# Patient Record
Sex: MALE | URBAN - METROPOLITAN AREA
[De-identification: names, ages, dates, MRNs, and addresses within clinical notes are randomized per-mention and may not be internally consistent; named-entity substitution may affect disease eponyms.]

---

## 2021-10-26 ENCOUNTER — APPOINTMENT (RX ONLY)
Dept: URBAN - METROPOLITAN AREA CLINIC 39 | Facility: CLINIC | Age: 26
Setting detail: DERMATOLOGY
End: 2021-10-26

## 2021-10-26 DIAGNOSIS — L10.0 PEMPHIGUS VULGARIS: ICD-10-CM | Status: INADEQUATELY CONTROLLED

## 2021-10-26 DIAGNOSIS — N48.1 BALANITIS: ICD-10-CM | Status: INADEQUATELY CONTROLLED

## 2021-10-26 DIAGNOSIS — L21.8 OTHER SEBORRHEIC DERMATITIS: ICD-10-CM | Status: INADEQUATELY CONTROLLED

## 2021-10-26 PROCEDURE — ? COUNSELING

## 2021-10-26 PROCEDURE — ? PRESCRIPTION

## 2021-10-26 PROCEDURE — ? KOH PREP

## 2021-10-26 PROCEDURE — 99204 OFFICE O/P NEW MOD 45 MIN: CPT

## 2021-10-26 PROCEDURE — ? PRESCRIPTION MEDICATION MANAGEMENT

## 2021-10-26 RX ORDER — HYDROCORTISONE, IODOQUINOL 10; 10 MG/G; MG/G
CREAM TOPICAL BID
Qty: 28.4 | Refills: 3 | Status: ERX | COMMUNITY
Start: 2021-10-26

## 2021-10-26 RX ADMIN — HYDROCORTISONE, IODOQUINOL: 10; 10 CREAM TOPICAL at 00:00

## 2021-10-26 ASSESSMENT — LOCATION DETAILED DESCRIPTION DERM
LOCATION DETAILED: LEFT LATERAL DISTAL DORSAL PENILE SHAFT
LOCATION DETAILED: RIGHT CENTRAL FRONTAL SCALP
LOCATION DETAILED: LEFT MEDIAL SUPERIOR CHEST
LOCATION DETAILED: RIGHT MEDIAL SUPERIOR CHEST

## 2021-10-26 ASSESSMENT — SEVERITY ASSESSMENT: SEVERITY: MILD TO MODERATE

## 2021-10-26 ASSESSMENT — LOCATION ZONE DERM
LOCATION ZONE: TRUNK
LOCATION ZONE: SCALP
LOCATION ZONE: PENIS

## 2021-10-26 ASSESSMENT — LOCATION SIMPLE DESCRIPTION DERM
LOCATION SIMPLE: RIGHT SCALP
LOCATION SIMPLE: LEFT PENIS
LOCATION SIMPLE: CHEST

## 2021-10-26 NOTE — PROCEDURE: COUNSELING
Detail Level: Zone
Detail Level: Simple
Patient Specific Counseling (Will Not Stick From Patient To Patient): Suggest he re-establish with [facility] if [place] is needed again.

## 2021-10-26 NOTE — PROCEDURE: KOH PREP
Detail Level: Simple
Cpt Desired: 
Showing: no pathologic findings
Koh Intro Text (From The.....): A KOH prep was ordered and evaluated from the
Koh Procedure Text (Tissue Harvesting Technique): A cotton swab was used to scrape the skin. The skin scrapings were placed on a glass slide, covered with a coverslip and a KOH solution was applied.

## 2021-10-26 NOTE — PROCEDURE: PRESCRIPTION MEDICATION MANAGEMENT
Samples Given: Ovace plus wash
Render In Strict Bullet Format?: No
Initiate Treatment: hydrocortisone 1 %-iodoquinol 1 % topical creamAAA groin BID PRN use up to 3 weeks out of 4\\n
Detail Level: Detailed
Plan: Possibly a manifestation of seb dermatitis or sebopsoriasis.